# Patient Record
Sex: FEMALE | Race: WHITE | NOT HISPANIC OR LATINO | Employment: OTHER | URBAN - METROPOLITAN AREA
[De-identification: names, ages, dates, MRNs, and addresses within clinical notes are randomized per-mention and may not be internally consistent; named-entity substitution may affect disease eponyms.]

---

## 2020-05-18 ENCOUNTER — TRANSFERRED RECORDS (OUTPATIENT)
Dept: MULTI SPECIALTY CLINIC | Facility: CLINIC | Age: 70
End: 2020-05-18

## 2021-11-18 ENCOUNTER — TRANSFERRED RECORDS (OUTPATIENT)
Dept: MULTI SPECIALTY CLINIC | Facility: CLINIC | Age: 71
End: 2021-11-18

## 2022-06-16 ENCOUNTER — OFFICE VISIT (OUTPATIENT)
Dept: FAMILY MEDICINE | Facility: OTHER | Age: 72
End: 2022-06-16
Attending: PHYSICIAN ASSISTANT

## 2022-06-16 VITALS
HEART RATE: 74 BPM | DIASTOLIC BLOOD PRESSURE: 68 MMHG | OXYGEN SATURATION: 98 % | WEIGHT: 160.1 LBS | RESPIRATION RATE: 16 BRPM | SYSTOLIC BLOOD PRESSURE: 136 MMHG | TEMPERATURE: 97.4 F

## 2022-06-16 DIAGNOSIS — S86.911A KNEE STRAIN, RIGHT, INITIAL ENCOUNTER: Primary | ICD-10-CM

## 2022-06-16 PROCEDURE — 99203 OFFICE O/P NEW LOW 30 MIN: CPT | Performed by: STUDENT IN AN ORGANIZED HEALTH CARE EDUCATION/TRAINING PROGRAM

## 2022-06-16 RX ORDER — FLUTICASONE PROPIONATE AND SALMETEROL XINAFOATE 230; 21 UG/1; UG/1
AEROSOL, METERED RESPIRATORY (INHALATION)
COMMUNITY
Start: 2022-03-14

## 2022-06-16 RX ORDER — AMLODIPINE BESYLATE 5 MG/1
TABLET ORAL
COMMUNITY
Start: 2022-05-02

## 2022-06-16 RX ORDER — EPINEPHRINE 0.3 MG/.3ML
0.3 INJECTION SUBCUTANEOUS
COMMUNITY
Start: 2022-01-25

## 2022-06-16 RX ORDER — ALBUTEROL SULFATE 90 UG/1
2 AEROSOL, METERED RESPIRATORY (INHALATION)
COMMUNITY
Start: 2022-01-25

## 2022-06-16 RX ORDER — TIOTROPIUM BROMIDE INHALATION SPRAY 3.12 UG/1
2 SPRAY, METERED RESPIRATORY (INHALATION)
COMMUNITY
Start: 2022-01-25

## 2022-06-16 RX ORDER — FLUTICASONE PROPIONATE 50 MCG
SPRAY, SUSPENSION (ML) NASAL
COMMUNITY
Start: 2022-03-10

## 2022-06-16 RX ORDER — ACYCLOVIR 400 MG/1
TABLET ORAL
COMMUNITY
Start: 2021-10-05

## 2022-06-16 ASSESSMENT — PAIN SCALES - GENERAL: PAINLEVEL: SEVERE PAIN (7)

## 2022-06-16 NOTE — NURSING NOTE
Chief Complaint   Patient presents with     Knee Injury     Right      Patient is here for right knee pain. A dog ran into her knee last evening. Has tried Voltaren gel. Patient took 1000 mg Tylenol this morning.     Initial /68   Pulse 74   Temp 97.4  F (36.3  C) (Tympanic)   Resp 16   Wt 72.6 kg (160 lb 1.6 oz)   LMP  (LMP Unknown)   SpO2 98%   Breastfeeding No  There is no height or weight on file to calculate BMI.  Medication Reconciliation: complete    Jo Raza MA       FOOD SECURITY SCREENING QUESTIONS:    The next two questions are to help us understand your food security.  If you are feeling you need any assistance in this area, we have resources available to support you today.    Hunger Vital Signs:  Within the past 12 months we worried whether our food would run out before we got money to buy more. Never  Within the past 12 months the food we bought just didn't last and we didn't have money to get more. Never  Jo Raza MA,LPN on 6/16/2022 at 10:41 AM

## 2022-06-16 NOTE — PROGRESS NOTES
Ms. Hicks is a 71 year old female who presents to the clinic for knee injury    HPI  Neighbors dog a lab ran into knee.   Right knee.   Hit hit lateral edge.   Pain on medial edge.   Has been walking on it.   Bending knee is painful.   No bone pain    Review of Systems     Reviewed and updated as needed this visit by Provider                     EXAM:   Vitals:    06/16/22 1038   BP: 136/68   Pulse: 74   Resp: 16   Temp: 97.4  F (36.3  C)   TempSrc: Tympanic   SpO2: 98%   Weight: 72.6 kg (160 lb 1.6 oz)         BP Readings from Last 3 Encounters:   06/16/22 136/68      Wt Readings from Last 3 Encounters:   06/16/22 72.6 kg (160 lb 1.6 oz)      There is no height or weight on file to calculate BMI.     Physical Exam   General: Pleasant 71-year-old woman sitting clinic no acute distress  MSK: Right knee with negative anterior and posterior drawer signs no pain on valgus and varus maneuvers of the knee.  Some pain on the medial aspect of the knee with flexion and extension of the knee.  No focal bone pain.         ASSESSMENT AND PLAN:    ICD-10-CM    1. Knee strain, right, initial encounter  S86.911A        Assessment/Plan:   Knee strain: Neighbors dog ran into the lateral aspect of her knee and now has medial knee pain.  No evidence of tendon or muscle or rust rupture.  Ligament exam was negative for tear or rupture.  No focal bony pain or fall to the ground.  Able to ambulate so discussed not getting an x-ray.  Patient will treat symptomatically with heat ice ibuprofen and Tylenol and return to clinic in 3 to 5 days if pain is not significantly improved        Electronically signed by:  Tano Degroot MD on 6/16/2022  Internal Medicine  [facility]

## 2023-09-01 ENCOUNTER — PATIENT OUTREACH (OUTPATIENT)
Dept: INTERNAL MEDICINE | Facility: OTHER | Age: 73
End: 2023-09-01

## 2023-09-01 NOTE — LETTER
[facility]  [address]  [place] [id number] [phone]       September 1, 2023    Marcelle Hicks  [address] [address]    Dear Marcelle,    We care about your health and have reviewed your health plan and are making recommendations based on this review, to optimize your health.    You are in particular need of attention regarding:  -Wellness (Physical) Visit     We are recommending that you:  -schedule a WELLNESS (Physical) APPOINTMENT with me.   (If you go elsewhere for Wellness visits then please disregard this reminder.)    In addition, here is a list of due or overdue Health Maintenance reminders.    Health Maintenance Due   Topic Date Due    Osteoporosis Screening  Never done    Discuss Advance Care Planning  Never done    Hepatitis C Screening  Never done    Cholesterol Lab  Never done    LUNG CANCER SCREENING  Never done    COVID-19 Vaccine (4 - Pfizer series) 12/02/2021    Annual Wellness Visit  06/28/2022    PHQ-2 (once per calendar year)  01/01/2023    FALL RISK ASSESSMENT  06/16/2023    Flu Vaccine (1) 09/01/2023       To address the above recommendations, we encourage you to contact us at [phone]. They will assist you with finding the most convenient time and location.    Thank you for trusting [facility] and we appreciate the opportunity to serve you.  We look forward to supporting your healthcare needs in the future.    Healthy Regards,    Your [facility] Team

## 2023-09-01 NOTE — TELEPHONE ENCOUNTER
Patient Quality Outreach    Patient is due for the following:   Physical Annual Wellness Visit    Next Steps:   Schedule a Annual Wellness Visit    Type of outreach:    Sent letter.      Questions for provider review:    None           Antonella Uribe on 9/1/2023 at 2:15 PM